# Patient Record
Sex: MALE | Race: WHITE | NOT HISPANIC OR LATINO | Employment: FULL TIME | URBAN - METROPOLITAN AREA
[De-identification: names, ages, dates, MRNs, and addresses within clinical notes are randomized per-mention and may not be internally consistent; named-entity substitution may affect disease eponyms.]

---

## 2021-04-06 ENCOUNTER — IMMUNIZATION (OUTPATIENT)
Dept: PRIMARY CARE CLINIC | Facility: CLINIC | Age: 51
End: 2021-04-06
Payer: MEDICAID

## 2021-04-06 DIAGNOSIS — Z23 NEED FOR VACCINATION: Primary | ICD-10-CM

## 2021-04-06 PROCEDURE — 0001A COVID-19, MRNA, LNP-S, PF, 30 MCG/0.3 ML DOSE VACCINE: CPT | Mod: PBBFAC | Performed by: FAMILY MEDICINE

## 2021-04-06 PROCEDURE — 91300 COVID-19, MRNA, LNP-S, PF, 30 MCG/0.3 ML DOSE VACCINE: CPT | Mod: PBBFAC,PN

## 2025-03-14 ENCOUNTER — TELEPHONE (OUTPATIENT)
Dept: ENDOSCOPY | Facility: HOSPITAL | Age: 55
End: 2025-03-14

## 2025-03-14 NOTE — TELEPHONE ENCOUNTER
----- Message from ROSE Kelly sent at 12/24/2024  2:00 PM CST -----  Regarding: FW: Appt  Contact: [phone]    ----- Message -----  From: Salvador Eldridge MA  Sent: 12/24/2024   1:45 PM CST  To: [facility]  Subject: FW: Appt                                             ----- Message -----  From: Dunia Prajapati  Sent: 10/23/2024   9:29 AM CST  To: Salvador Eldridge MA  Subject: FW: Appt                                           ----- Message -----  From: Patience Hand  Sent: 10/22/2024   1:56 PM CDT  To: [facility]  Subject: Appt                                             Who call ? Delfino Teague     What is the request Details :  Pt calling to speak with someone in provider office regards scheduling an appt for colonoscopy. States he would like to be scheduled with Dr. Green.  Orders are in media . Please call pt back.        Can clinic  use patient portal  : No     What number to call back : [phone]